# Patient Record
Sex: FEMALE | Race: WHITE | Employment: FULL TIME | URBAN - METROPOLITAN AREA
[De-identification: names, ages, dates, MRNs, and addresses within clinical notes are randomized per-mention and may not be internally consistent; named-entity substitution may affect disease eponyms.]

---

## 2023-05-02 ENCOUNTER — APPOINTMENT (OUTPATIENT)
Dept: CT IMAGING | Age: 53
End: 2023-05-02
Payer: COMMERCIAL

## 2023-05-02 ENCOUNTER — HOSPITAL ENCOUNTER (EMERGENCY)
Age: 53
Discharge: HOME | End: 2023-05-02
Payer: COMMERCIAL

## 2023-05-02 VITALS
SYSTOLIC BLOOD PRESSURE: 164 MMHG | BODY MASS INDEX: 38.77 KG/M2 | HEART RATE: 82 BPM | RESPIRATION RATE: 18 BRPM | DIASTOLIC BLOOD PRESSURE: 80 MMHG | WEIGHT: 255 LBS | TEMPERATURE: 98.1 F | OXYGEN SATURATION: 100 %

## 2023-05-02 DIAGNOSIS — K57.92 DIVERTICULITIS: Primary | ICD-10-CM

## 2023-05-02 LAB
ALBUMIN SERPL-MCNC: 4.2 G/DL (ref 3.4–5)
ALBUMIN/GLOB SERPL: 1.3 {RATIO} (ref 1.1–2.2)
ALP SERPL-CCNC: 119 U/L (ref 40–129)
ALT SERPL-CCNC: 14 U/L (ref 10–40)
ANION GAP SERPL CALCULATED.3IONS-SCNC: 12 MMOL/L (ref 3–16)
AST SERPL-CCNC: 25 U/L (ref 15–37)
BACTERIA URNS QL MICRO: ABNORMAL /HPF
BASOPHILS # BLD: 0 K/UL (ref 0–0.2)
BASOPHILS NFR BLD: 0.4 %
BILIRUB SERPL-MCNC: 0.5 MG/DL (ref 0–1)
BILIRUB UR QL STRIP.AUTO: NEGATIVE
BUN SERPL-MCNC: 13 MG/DL (ref 7–20)
CALCIUM SERPL-MCNC: 9.3 MG/DL (ref 8.3–10.6)
CHLORIDE SERPL-SCNC: 103 MMOL/L (ref 99–110)
CLARITY UR: ABNORMAL
CO2 SERPL-SCNC: 25 MMOL/L (ref 21–32)
COLOR UR: YELLOW
CREAT SERPL-MCNC: 1 MG/DL (ref 0.6–1.1)
DEPRECATED RDW RBC AUTO: 14.1 % (ref 12.4–15.4)
EOSINOPHIL # BLD: 0.1 K/UL (ref 0–0.6)
EOSINOPHIL NFR BLD: 0.7 %
EPI CELLS #/AREA URNS AUTO: 19 /HPF (ref 0–5)
GFR SERPLBLD CREATININE-BSD FMLA CKD-EPI: >60 ML/MIN/{1.73_M2}
GLUCOSE SERPL-MCNC: 98 MG/DL (ref 70–99)
GLUCOSE UR STRIP.AUTO-MCNC: NEGATIVE MG/DL
HCG SERPL QL: NEGATIVE
HCT VFR BLD AUTO: 40.8 % (ref 36–48)
HGB BLD-MCNC: 13.4 G/DL (ref 12–16)
HGB UR QL STRIP.AUTO: ABNORMAL
HYALINE CASTS #/AREA URNS AUTO: 0 /LPF (ref 0–8)
KETONES UR STRIP.AUTO-MCNC: NEGATIVE MG/DL
LACTATE BLDV-SCNC: 1.9 MMOL/L (ref 0.4–1.9)
LEUKOCYTE ESTERASE UR QL STRIP.AUTO: ABNORMAL
LIPASE SERPL-CCNC: 17 U/L (ref 13–60)
LYMPHOCYTES # BLD: 1.9 K/UL (ref 1–5.1)
LYMPHOCYTES NFR BLD: 24.3 %
MCH RBC QN AUTO: 28.7 PG (ref 26–34)
MCHC RBC AUTO-ENTMCNC: 32.8 G/DL (ref 31–36)
MCV RBC AUTO: 87.4 FL (ref 80–100)
MONOCYTES # BLD: 0.4 K/UL (ref 0–1.3)
MONOCYTES NFR BLD: 5 %
NEUTROPHILS # BLD: 5.5 K/UL (ref 1.7–7.7)
NEUTROPHILS NFR BLD: 69.6 %
NITRITE UR QL STRIP.AUTO: NEGATIVE
PH UR STRIP.AUTO: 5.5 [PH] (ref 5–8)
PLATELET # BLD AUTO: 330 K/UL (ref 135–450)
PMV BLD AUTO: 7.9 FL (ref 5–10.5)
POTASSIUM SERPL-SCNC: 3.6 MMOL/L (ref 3.5–5.1)
PROT SERPL-MCNC: 7.5 G/DL (ref 6.4–8.2)
PROT UR STRIP.AUTO-MCNC: ABNORMAL MG/DL
RBC # BLD AUTO: 4.66 M/UL (ref 4–5.2)
RBC #/AREA URNS HPF: ABNORMAL /HPF (ref 0–4)
SODIUM SERPL-SCNC: 140 MMOL/L (ref 136–145)
SP GR UR STRIP.AUTO: >=1.03 (ref 1–1.03)
UA COMPLETE W REFLEX CULTURE PNL UR: YES
UA DIPSTICK W REFLEX MICRO PNL UR: YES
URN SPEC COLLECT METH UR: ABNORMAL
UROBILINOGEN UR STRIP-ACNC: 0.2 E.U./DL
WBC # BLD AUTO: 8 K/UL (ref 4–11)
WBC #/AREA URNS AUTO: 13 /HPF (ref 0–5)
YEAST URNS QL MICRO: PRESENT /HPF

## 2023-05-02 PROCEDURE — 6360000004 HC RX CONTRAST MEDICATION: Performed by: PHYSICIAN ASSISTANT

## 2023-05-02 PROCEDURE — 99285 EMERGENCY DEPT VISIT HI MDM: CPT

## 2023-05-02 PROCEDURE — 87086 URINE CULTURE/COLONY COUNT: CPT

## 2023-05-02 PROCEDURE — 81001 URINALYSIS AUTO W/SCOPE: CPT

## 2023-05-02 PROCEDURE — 96374 THER/PROPH/DIAG INJ IV PUSH: CPT

## 2023-05-02 PROCEDURE — 85025 COMPLETE CBC W/AUTO DIFF WBC: CPT

## 2023-05-02 PROCEDURE — 84703 CHORIONIC GONADOTROPIN ASSAY: CPT

## 2023-05-02 PROCEDURE — 6370000000 HC RX 637 (ALT 250 FOR IP): Performed by: PHYSICIAN ASSISTANT

## 2023-05-02 PROCEDURE — 6360000002 HC RX W HCPCS: Performed by: PHYSICIAN ASSISTANT

## 2023-05-02 PROCEDURE — 96375 TX/PRO/DX INJ NEW DRUG ADDON: CPT

## 2023-05-02 PROCEDURE — 80053 COMPREHEN METABOLIC PANEL: CPT

## 2023-05-02 PROCEDURE — 87040 BLOOD CULTURE FOR BACTERIA: CPT

## 2023-05-02 PROCEDURE — 74177 CT ABD & PELVIS W/CONTRAST: CPT

## 2023-05-02 PROCEDURE — 83605 ASSAY OF LACTIC ACID: CPT

## 2023-05-02 PROCEDURE — 83690 ASSAY OF LIPASE: CPT

## 2023-05-02 PROCEDURE — 2580000003 HC RX 258: Performed by: PHYSICIAN ASSISTANT

## 2023-05-02 RX ORDER — MORPHINE SULFATE 2 MG/ML
2 INJECTION, SOLUTION INTRAMUSCULAR; INTRAVENOUS
Status: COMPLETED | OUTPATIENT
Start: 2023-05-02 | End: 2023-05-02

## 2023-05-02 RX ORDER — 0.9 % SODIUM CHLORIDE 0.9 %
1000 INTRAVENOUS SOLUTION INTRAVENOUS ONCE
Status: COMPLETED | OUTPATIENT
Start: 2023-05-02 | End: 2023-05-02

## 2023-05-02 RX ORDER — ONDANSETRON 4 MG/1
4 TABLET, FILM COATED ORAL EVERY 8 HOURS PRN
Qty: 20 TABLET | Refills: 0 | Status: SHIPPED | OUTPATIENT
Start: 2023-05-02

## 2023-05-02 RX ORDER — AMOXICILLIN AND CLAVULANATE POTASSIUM 875; 125 MG/1; MG/1
1 TABLET, FILM COATED ORAL EVERY 12 HOURS SCHEDULED
Status: DISCONTINUED | OUTPATIENT
Start: 2023-05-02 | End: 2023-05-02 | Stop reason: HOSPADM

## 2023-05-02 RX ORDER — ONDANSETRON 2 MG/ML
4 INJECTION INTRAMUSCULAR; INTRAVENOUS EVERY 6 HOURS PRN
Status: DISCONTINUED | OUTPATIENT
Start: 2023-05-02 | End: 2023-05-02 | Stop reason: HOSPADM

## 2023-05-02 RX ORDER — AMOXICILLIN AND CLAVULANATE POTASSIUM 875; 125 MG/1; MG/1
1 TABLET, FILM COATED ORAL 2 TIMES DAILY
Qty: 20 TABLET | Refills: 0 | Status: SHIPPED | OUTPATIENT
Start: 2023-05-02 | End: 2023-05-12

## 2023-05-02 RX ADMIN — SODIUM CHLORIDE 1000 ML: 9 INJECTION, SOLUTION INTRAVENOUS at 18:41

## 2023-05-02 RX ADMIN — ONDANSETRON 4 MG: 2 INJECTION INTRAMUSCULAR; INTRAVENOUS at 18:39

## 2023-05-02 RX ADMIN — MORPHINE SULFATE 2 MG: 2 INJECTION, SOLUTION INTRAMUSCULAR; INTRAVENOUS at 18:39

## 2023-05-02 RX ADMIN — IOPAMIDOL 75 ML: 755 INJECTION, SOLUTION INTRAVENOUS at 18:22

## 2023-05-02 RX ADMIN — AMOXICILLIN AND CLAVULANATE POTASSIUM 1 TABLET: 875; 125 TABLET, FILM COATED ORAL at 19:17

## 2023-05-02 ASSESSMENT — ENCOUNTER SYMPTOMS
VOMITING: 0
CONSTIPATION: 0
SORE THROAT: 0
ABDOMINAL PAIN: 1
SHORTNESS OF BREATH: 0
BACK PAIN: 0
EYE PAIN: 0
NAUSEA: 1
COUGH: 0
DIARRHEA: 0
RHINORRHEA: 0

## 2023-05-02 NOTE — DISCHARGE INSTRUCTIONS
Take antibiotic as prescribed    Continue Tylenol ibuprofen as needed for pain    Take antinausea medication for nausea sensation    Follow-up primary care doctor in 3 days for recheck.

## 2023-05-02 NOTE — ED PROVIDER NOTES
amoxicillin-clavulanate (AUGMENTIN) 875-125 MG per tablet Take 1 tablet by mouth 2 times daily for 10 days, Disp-20 tablet, R-0Normal      ondansetron (ZOFRAN) 4 MG tablet Take 1 tablet by mouth every 8 hours as needed for Nausea, Disp-20 tablet, R-0Normal             DISCONTINUED MEDICATIONS:  Discharge Medication List as of 5/2/2023  7:12 PM                 (Please note that portions of this note were completed with a voice recognition program.  Efforts were made to edit the dictations but occasionally words are mis-transcribed.)    RABIA Nolan (electronically signed)            RABIA Nolan  05/02/23 [id number]

## 2023-05-03 LAB — BACTERIA UR CULT: NORMAL

## 2023-05-06 LAB
BACTERIA BLD CULT ORG #2: NORMAL
BACTERIA BLD CULT: NORMAL

## 2023-12-14 ENCOUNTER — OFFICE VISIT (OUTPATIENT)
Age: 53
End: 2023-12-14

## 2023-12-14 VITALS
BODY MASS INDEX: 35.97 KG/M2 | SYSTOLIC BLOOD PRESSURE: 112 MMHG | WEIGHT: 237.3 LBS | OXYGEN SATURATION: 97 % | DIASTOLIC BLOOD PRESSURE: 71 MMHG | HEIGHT: 68 IN | HEART RATE: 120 BPM | TEMPERATURE: 98.1 F

## 2023-12-14 DIAGNOSIS — J00 NASOPHARYNGITIS: ICD-10-CM

## 2023-12-14 DIAGNOSIS — R05.9 COUGH, UNSPECIFIED TYPE: Primary | ICD-10-CM

## 2023-12-14 RX ORDER — AZITHROMYCIN 250 MG/1
250 TABLET, FILM COATED ORAL SEE ADMIN INSTRUCTIONS
Qty: 6 TABLET | Refills: 0 | Status: SHIPPED | OUTPATIENT
Start: 2023-12-14 | End: 2023-12-19

## 2024-02-13 ENCOUNTER — OFFICE VISIT (OUTPATIENT)
Age: 54
End: 2024-02-13

## 2024-02-13 VITALS
OXYGEN SATURATION: 98 % | BODY MASS INDEX: 34.86 KG/M2 | HEART RATE: 71 BPM | TEMPERATURE: 97.8 F | WEIGHT: 230 LBS | RESPIRATION RATE: 17 BRPM | DIASTOLIC BLOOD PRESSURE: 80 MMHG | SYSTOLIC BLOOD PRESSURE: 118 MMHG | HEIGHT: 68 IN

## 2024-02-13 DIAGNOSIS — R68.89 FLU-LIKE SYMPTOMS: Primary | ICD-10-CM

## 2024-02-13 DIAGNOSIS — J06.9 UPPER RESPIRATORY TRACT INFECTION, UNSPECIFIED TYPE: ICD-10-CM

## 2024-02-13 DIAGNOSIS — Z20.828 EXPOSURE TO THE FLU: ICD-10-CM

## 2024-02-13 LAB
INFLUENZA VIRUS A RNA: NORMAL
INFLUENZA VIRUS B RNA: NORMAL

## 2024-02-13 RX ORDER — OSELTAMIVIR PHOSPHATE 75 MG/1
75 CAPSULE ORAL 2 TIMES DAILY
Qty: 10 CAPSULE | Refills: 0 | Status: SHIPPED | OUTPATIENT
Start: 2024-02-13 | End: 2024-02-18

## 2024-02-13 NOTE — PROGRESS NOTES
Jennifer Randle (:  1970) is a 53 y.o. female,Established patient, here for evaluation of the following chief complaint(s):  Congestion, Cough, Generalized Body Aches, and Fever      ASSESSMENT/PLAN:    ICD-10-CM    1. Flu-like symptoms  R68.89 oseltamivir (TAMIFLU) 75 MG capsule      2. Upper respiratory tract infection, unspecified type  J06.9 POCT Influenza A/B DNA (Alere i)      3. Exposure to the flu  Z20.828 POCT Influenza A/B DNA (Alere i)     oseltamivir (TAMIFLU) 75 MG capsule        Discussed viral infection vs bacterial infection.  Symptoms today appear viral. Treat with OTC medications as discussed.  Patient V/U.   Stay hydrated.  Tylenol for pain and fever PRN.  Follow up in 2-3 days if symptoms persist or if symptoms worsen.    SUBJECTIVE/OBJECTIVE:  HPI  HPI:   53 y.o. female presents with symptoms of fatigue, HA, sore throat, congestion and cough ongoing since last night. Denies fever. Has taken Tylenol for symptoms with no relief. Co-workers were diagnosed with the Flu recently.    Vitals:    24 1534   BP: 118/80   Pulse: 71   Resp: 17   Temp: 97.8 °F (36.6 °C)   SpO2: 98%   Weight: 104.3 kg (230 lb)   Height: 1.727 m (5' 8\")     Results for POC orders placed in visit on 24   POCT Influenza A/B DNA (Alere i)   Result Value Ref Range    Influenza virus A RNA neg     Influenza virus B RNA neg      Review of Systems   Constitutional:  Positive for chills and fatigue.   HENT:  Positive for congestion and sore throat.    Eyes: Negative.    Respiratory:  Positive for cough.    Cardiovascular: Negative.    Gastrointestinal: Negative.    Genitourinary: Negative.    Musculoskeletal: Negative.    Skin: Negative.    Neurological: Negative.    Psychiatric/Behavioral: Negative.     All other systems reviewed and are negative.      Physical Exam  Vitals and nursing note reviewed.   Constitutional:       Appearance: Normal appearance.   HENT:      Head: Normocephalic.      Right Ear: Tympanic

## 2024-03-11 ENCOUNTER — OFFICE VISIT (OUTPATIENT)
Age: 54
End: 2024-03-11

## 2024-03-11 VITALS
WEIGHT: 240.4 LBS | BODY MASS INDEX: 36.43 KG/M2 | SYSTOLIC BLOOD PRESSURE: 108 MMHG | HEIGHT: 68 IN | HEART RATE: 95 BPM | TEMPERATURE: 97.8 F | OXYGEN SATURATION: 96 % | DIASTOLIC BLOOD PRESSURE: 67 MMHG

## 2024-03-11 DIAGNOSIS — R05.1 ACUTE COUGH: ICD-10-CM

## 2024-03-11 DIAGNOSIS — J40 BRONCHITIS: Primary | ICD-10-CM

## 2024-03-11 RX ORDER — BENZONATATE 200 MG/1
200 CAPSULE ORAL 3 TIMES DAILY PRN
Qty: 21 CAPSULE | Refills: 0 | Status: SHIPPED | OUTPATIENT
Start: 2024-03-11

## 2024-03-11 RX ORDER — LISINOPRIL 10 MG/1
10 TABLET ORAL DAILY
COMMUNITY

## 2024-03-11 RX ORDER — AZITHROMYCIN 250 MG/1
TABLET, FILM COATED ORAL
Qty: 6 TABLET | Refills: 0 | Status: SHIPPED | OUTPATIENT
Start: 2024-03-11 | End: 2024-03-21

## 2024-03-11 NOTE — PROGRESS NOTES
injected, non icteric    Ears: Right canal clear / TM normal           Left ear canal clear / TM normal  Nose/Sinus:  no nasal congestion/no drainage   Mouth:                 Mucous membranes moist               Oropharynx:  clear, no erythema, no exudates, voice normal  Neck:    supple, non tender,               No cervical lymph nodes   Pulmonary/Lungs:  effort normal, no stridor                                 Auscultation: good air movement / breath sounds clear  Cardio-vascular:  normal rate and rhythm                              Heart sounds normal-no murmur-rub   Abdomen:    soft .. Non tender, no Right CVA tenderness / No Left CVA tenderness  Muscular skeleton:  motor strength normal / muscle tone normal                                  Extremities/joints: no tenderness, normal movements                                  Lower extremities: no calf tenderness  Neurological:  no focal deficit  Skin: no rash   Psychiatric:   behavior appropriate--no confusion                              .  An electronic signature was used to authenticate this note.    --Mani Willis MD

## 2024-03-12 ASSESSMENT — ENCOUNTER SYMPTOMS
SORE THROAT: 1
SHORTNESS OF BREATH: 0
SINUS PRESSURE: 0
COUGH: 1
WHEEZING: 0

## 2025-05-09 ENCOUNTER — OFFICE VISIT (OUTPATIENT)
Age: 55
End: 2025-05-09

## 2025-05-09 VITALS
WEIGHT: 242 LBS | OXYGEN SATURATION: 99 % | DIASTOLIC BLOOD PRESSURE: 60 MMHG | HEIGHT: 68 IN | HEART RATE: 72 BPM | BODY MASS INDEX: 36.68 KG/M2 | RESPIRATION RATE: 16 BRPM | TEMPERATURE: 98.7 F | SYSTOLIC BLOOD PRESSURE: 101 MMHG

## 2025-05-09 DIAGNOSIS — Z02.1 PHYSICAL EXAM, PRE-EMPLOYMENT: Primary | ICD-10-CM

## 2025-05-09 ASSESSMENT — VISUAL ACUITY
OS_CC: 20/20
OD_CC: 20/20

## 2025-05-09 NOTE — PROGRESS NOTES
Jennifer Randle (:  1970) is a 54 y.o. 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  She has history of hypertension and hypothyroidism.  She is compliant with medication and appointments.  Her health is well-maintained.  She is cleared for employment.  They are epilated at night but they should not be that should be getting on a routine    ASSESSMENT/PLAN:  1. Physical exam, pre-employment  Patient advised to maintain chronic care management.        Return if symptoms worsen or fail to improve.    SUBJECTIVE/OBJECTIVE:  54-year-old female presents to the clinic for preemployment examination for [employer] clerical position.  She has a history of hypothyroidism and hypertension which is well-managed.      History provided by:  Patient      Vitals:    25 1640   BP: 101/60   Pulse: 72   Resp: 16   Temp: 98.7 °F (37.1 °C)   TempSrc: Temporal   SpO2: 99%   Weight: 109.8 kg (242 lb)   Height: 1.727 m (5' 8\")       Review of Systems   Constitutional: Negative.  Negative for fatigue and fever.   HENT: Negative.  Negative for congestion, dental problem, postnasal drip and rhinorrhea.    Eyes: Negative.    Respiratory: Negative.     Cardiovascular: Negative.    Endocrine: Negative.    Genitourinary: Negative.  Negative for flank pain.   Musculoskeletal: Negative.  Negative for arthralgias, joint swelling and myalgias.   Skin: Negative.    Allergic/Immunologic: Negative.    Neurological: Negative.  Negative for dizziness, weakness and headaches.   Hematological: Negative.    Psychiatric/Behavioral: Negative.         Physical Exam  Vitals and nursing note reviewed.   Constitutional:       General: She is not in acute distress.     Appearance: Normal appearance. She is obese. She is not ill-appearing, toxic-appearing or diaphoretic.   HENT:

## 2025-05-10 ASSESSMENT — ENCOUNTER SYMPTOMS
ALLERGIC/IMMUNOLOGIC NEGATIVE: 1
EYES NEGATIVE: 1
RESPIRATORY NEGATIVE: 1
RHINORRHEA: 0